# Patient Record
Sex: FEMALE | Race: OTHER | HISPANIC OR LATINO
[De-identification: names, ages, dates, MRNs, and addresses within clinical notes are randomized per-mention and may not be internally consistent; named-entity substitution may affect disease eponyms.]

---

## 2022-05-20 PROBLEM — Z00.00 ENCOUNTER FOR PREVENTIVE HEALTH EXAMINATION: Status: ACTIVE | Noted: 2022-05-20

## 2022-05-27 ENCOUNTER — APPOINTMENT (OUTPATIENT)
Dept: OBGYN | Facility: CLINIC | Age: 42
End: 2022-05-27

## 2022-06-08 ENCOUNTER — APPOINTMENT (OUTPATIENT)
Dept: OBGYN | Facility: CLINIC | Age: 42
End: 2022-06-08
Payer: MEDICAID

## 2022-06-08 ENCOUNTER — LABORATORY RESULT (OUTPATIENT)
Age: 42
End: 2022-06-08

## 2022-06-08 VITALS
SYSTOLIC BLOOD PRESSURE: 110 MMHG | BODY MASS INDEX: 38.14 KG/M2 | DIASTOLIC BLOOD PRESSURE: 70 MMHG | WEIGHT: 202 LBS | HEIGHT: 61 IN

## 2022-06-08 DIAGNOSIS — Z82.49 FAMILY HISTORY OF ISCHEMIC HEART DISEASE AND OTHER DISEASES OF THE CIRCULATORY SYSTEM: ICD-10-CM

## 2022-06-08 DIAGNOSIS — Z78.9 OTHER SPECIFIED HEALTH STATUS: ICD-10-CM

## 2022-06-08 DIAGNOSIS — Z83.438 FAMILY HISTORY OF OTHER DISORDER OF LIPOPROTEIN METABOLISM AND OTHER LIPIDEMIA: ICD-10-CM

## 2022-06-08 DIAGNOSIS — Z01.419 ENCOUNTER FOR GYNECOLOGICAL EXAMINATION (GENERAL) (ROUTINE) W/OUT ABNORMAL FINDINGS: ICD-10-CM

## 2022-06-08 DIAGNOSIS — Z98.890 OTHER SPECIFIED POSTPROCEDURAL STATES: ICD-10-CM

## 2022-06-08 DIAGNOSIS — Z12.39 ENCOUNTER FOR OTHER SCREENING FOR MALIGNANT NEOPLASM OF BREAST: ICD-10-CM

## 2022-06-08 PROCEDURE — 99395 PREV VISIT EST AGE 18-39: CPT

## 2022-06-08 NOTE — PHYSICAL EXAM
[Chaperone Present] : A chaperone was present in the examining room during all aspects of the physical examination [Appropriately responsive] : appropriately responsive [Alert] : alert [No Acute Distress] : no acute distress [No Lymphadenopathy] : no lymphadenopathy [Regular Rate Rhythm] : regular rate rhythm [No Murmurs] : no murmurs [Clear to Auscultation B/L] : clear to auscultation bilaterally [Soft] : soft [Non-tender] : non-tender [Non-distended] : non-distended [No HSM] : No HSM [No Lesions] : no lesions [No Mass] : no mass [Oriented x3] : oriented x3 [Examination Of The Breasts] : a normal appearance [No Masses] : no breast masses were palpable [Labia Majora] : normal [Labia Minora] : normal [Normal] : normal [Tenderness] : tender [Enlarged ___ wks] : enlarged [unfilled] ~Uweeks [Uterine Adnexae] : normal [Declined] : Patient declined rectal exam [FreeTextEntry1] : PH [FreeTextEntry6] : Fundus of uterus [FreeTextEntry8] : Tender fundus of uterus enlarged approximately 6 weeks no adnexal masses

## 2022-06-08 NOTE — HISTORY OF PRESENT ILLNESS
[HIV test declined] : HIV test declined [Syphilis test declined] : Syphilis test declined [Gonorrhea test declined] : Gonorrhea test declined [Chlamydia test declined] : Chlamydia test declined [Trichomonas test declined] : Trichomonas test declined [Hepatitis B test declined] : Hepatitis B test declined [Hepatitis C test declined] : Hepatitis C test declined [N] : Patient does not use contraception [Y] : Positive pregnancy history [Normal Amount/Duration] :  normal amount and duration [Regular Cycle Intervals] : periods have been regular [Currently Active] : currently active [Men] : men [Vaginal] : vaginal [No] : No [HPV test declined] : HPV test declined [TextBox_4] : NEW PT HERE FOR WELL WOMEN'S EXAM C/O ABDOMINAL PAIN [PapSmeardate] : 2014 [LMPDate] : 6/1/22 [MensesFreq] : 28 [MensesLength] : 4-5 [PGHxTotal] : 3 [[facility]Term] : 3 [[facility] Corporation[facility]] : 3 [FreeTextEntry1] : 6/1/22

## 2022-06-08 NOTE — PLAN
[FreeTextEntry1] : Patient is here for annual examination she is status post NovaSure ablation and bilateral tubal ligation approximately 7 years ago now complains of midline lower pelvic abdominal pain with tenderness to palpation over fundus of uterus.  Patient will be sent for transvaginal sonogram, Pap smear has been completed she will continue vitamin D3 multivitamin and exercise we discussed weight loss and all questions answered in detail.  Patient will return in 12 months

## 2022-06-10 LAB
C TRACH RRNA SPEC QL NAA+PROBE: NOT DETECTED
HPV HIGH+LOW RISK DNA PNL CVX: DETECTED
N GONORRHOEA RRNA SPEC QL NAA+PROBE: NOT DETECTED
SOURCE TP AMPLIFICATION: NORMAL

## 2022-06-17 ENCOUNTER — APPOINTMENT (OUTPATIENT)
Dept: OBGYN | Facility: CLINIC | Age: 42
End: 2022-06-17

## 2022-06-22 ENCOUNTER — APPOINTMENT (OUTPATIENT)
Dept: OBGYN | Facility: CLINIC | Age: 42
End: 2022-06-22
Payer: MEDICAID

## 2022-06-22 ENCOUNTER — ASOB RESULT (OUTPATIENT)
Age: 42
End: 2022-06-22

## 2022-06-22 ENCOUNTER — NON-APPOINTMENT (OUTPATIENT)
Age: 42
End: 2022-06-22

## 2022-06-22 PROCEDURE — 76830 TRANSVAGINAL US NON-OB: CPT

## 2022-06-22 PROCEDURE — 76857 US EXAM PELVIC LIMITED: CPT

## 2022-11-22 ENCOUNTER — OFFICE (OUTPATIENT)
Dept: URBAN - METROPOLITAN AREA CLINIC 63 | Facility: CLINIC | Age: 42
Setting detail: OPHTHALMOLOGY
End: 2022-11-22
Payer: COMMERCIAL

## 2022-11-22 DIAGNOSIS — H40.013: ICD-10-CM

## 2022-11-22 DIAGNOSIS — G43.119: ICD-10-CM

## 2022-11-22 DIAGNOSIS — D31.40: ICD-10-CM

## 2022-11-22 DIAGNOSIS — H11.153: ICD-10-CM

## 2022-11-22 DIAGNOSIS — H16.422: ICD-10-CM

## 2022-11-22 PROCEDURE — 92014 COMPRE OPH EXAM EST PT 1/>: CPT | Performed by: STUDENT IN AN ORGANIZED HEALTH CARE EDUCATION/TRAINING PROGRAM

## 2022-11-22 PROCEDURE — 92285 EXTERNAL OCULAR PHOTOGRAPHY: CPT | Performed by: STUDENT IN AN ORGANIZED HEALTH CARE EDUCATION/TRAINING PROGRAM

## 2022-11-22 ASSESSMENT — PACHYMETRY
OS_CT_UM: 539
OS_CT_CORRECTION: 1
OD_CT_UM: 541
OD_CT_CORRECTION: 0

## 2022-11-22 ASSESSMENT — KERATOMETRY
OS_AXISANGLE_DEGREES: 097
OS_K1POWER_DIOPTERS: 43.00
OD_AXISANGLE_DEGREES: 090
OD_K2POWER_DIOPTERS: 43.25
OS_K2POWER_DIOPTERS: 43.75
OD_K1POWER_DIOPTERS: 42.75

## 2022-11-22 ASSESSMENT — TONOMETRY
OS_IOP_MMHG: 21
OD_IOP_MMHG: 18
OD_IOP_MMHG: 17
OS_IOP_MMHG: 20

## 2022-11-22 ASSESSMENT — VISUAL ACUITY
OD_BCVA: 20/20
OS_BCVA: 20/20

## 2022-11-22 ASSESSMENT — REFRACTION_AUTOREFRACTION
OS_SPHERE: +0.75
OD_SPHERE: +0.50
OS_CYLINDER: -0.25
OD_AXIS: 000
OD_CYLINDER: 0.00
OS_AXIS: 030

## 2022-11-22 ASSESSMENT — CONFRONTATIONAL VISUAL FIELD TEST (CVF)
OS_FINDINGS: FULL
OD_FINDINGS: FULL

## 2022-11-22 ASSESSMENT — AXIALLENGTH_DERIVED
OD_AL: 23.5808
OS_AL: 23.396

## 2022-11-22 ASSESSMENT — VASCULARIZATION: OS_VASCULARIZATION: INFERIOR PANNUS

## 2022-11-22 ASSESSMENT — SPHEQUIV_DERIVED
OD_SPHEQUIV: 0.5
OS_SPHEQUIV: 0.625

## 2023-05-10 ENCOUNTER — APPOINTMENT (OUTPATIENT)
Dept: OBGYN | Facility: CLINIC | Age: 43
End: 2023-05-10
Payer: MEDICAID

## 2023-05-10 DIAGNOSIS — B37.31 ACUTE CANDIDIASIS OF VULVA AND VAGINA: ICD-10-CM

## 2023-05-10 LAB
BILIRUB UR QL STRIP: NEGATIVE
CLARITY UR: CLEAR
COLLECTION METHOD: NORMAL
GLUCOSE UR-MCNC: NEGATIVE
HCG UR QL: 0.2 EU/DL
HGB UR QL STRIP.AUTO: NORMAL
KETONES UR-MCNC: NEGATIVE
LEUKOCYTE ESTERASE UR QL STRIP: NEGATIVE
NITRITE UR QL STRIP: NEGATIVE
PH UR STRIP: 5.5
PROT UR STRIP-MCNC: NEGATIVE
SP GR UR STRIP: 1.02

## 2023-05-10 PROCEDURE — 81002 URINALYSIS NONAUTO W/O SCOPE: CPT

## 2023-05-10 PROCEDURE — 99213 OFFICE O/P EST LOW 20 MIN: CPT

## 2023-05-10 RX ORDER — TERCONAZOLE 8 MG/G
0.8 CREAM VAGINAL
Qty: 1 | Refills: 0 | Status: ACTIVE | COMMUNITY
Start: 2023-05-10 | End: 1900-01-01

## 2023-05-10 RX ORDER — CLOTRIMAZOLE AND BETAMETHASONE DIPROPIONATE 10; .5 MG/G; MG/G
1-0.05 CREAM TOPICAL
Qty: 1 | Refills: 0 | Status: ACTIVE | COMMUNITY
Start: 2023-05-10 | End: 1900-01-01

## 2023-05-10 NOTE — HISTORY OF PRESENT ILLNESS
[Normal Amount/Duration] :  normal amount and duration [Regular Cycle Intervals] : periods have been regular [FreeTextEntry1] : 43 y/o female here for UTI symptoms. Pt went to [facility] two weeks ago and was told she has a UTI. Pt was given medication and 4 days later she broke out in hives and it did not agree with her stomach. Since then she has vaginal irritation and a heavier bleeding with her period.  Patient states the majority of irritation is on the external genitalia vulva majora with severe pruritus.  She does admit to vaginal discharge as well. [TextBox_4] : UTI SYMPTOMS\par LMP:5/1/2023 [MensesFreq] : 28 [MensesLength] : 5 [TextBox_13] : 28 [TextBox_15] : 5

## 2023-05-10 NOTE — PLAN
[FreeTextEntry1] : Patient will be treated with terconazole No. 3 and clotrimazole betamethasone cream twice daily for 5 days urine analysis is negative culture will be resent return for yearly checkup

## 2023-05-10 NOTE — PHYSICAL EXAM
[Chaperone Present] : A chaperone was present in the examining room during all aspects of the physical examination [Vulvitis] : vulvitis [Labia Majora] : normal [Labia Minora] : normal [Discharge] : a  ~M vaginal discharge was present [Moderate] : moderate [White] : white [Cheesy] : cheesy [Mucoid] : mucoid [Normal] : normal [Uterine Adnexae] : normal [Declined] : Patient declined rectal exam [FreeTextEntry1] : Asked tensive

## 2023-05-10 NOTE — REVIEW OF SYSTEMS
[Patient Intake Form Reviewed] : Patient intake form was reviewed [Negative] : Heme/Lymph [Genital Rash/Irritation] : genital rash/irritation

## 2023-06-02 ENCOUNTER — APPOINTMENT (OUTPATIENT)
Dept: OBGYN | Facility: CLINIC | Age: 43
End: 2023-06-02
Payer: MEDICAID

## 2023-06-02 ENCOUNTER — ASOB RESULT (OUTPATIENT)
Age: 43
End: 2023-06-02

## 2023-06-02 PROCEDURE — 76830 TRANSVAGINAL US NON-OB: CPT

## 2023-07-31 ENCOUNTER — LABORATORY RESULT (OUTPATIENT)
Age: 43
End: 2023-07-31

## 2023-07-31 ENCOUNTER — APPOINTMENT (OUTPATIENT)
Dept: OBGYN | Facility: CLINIC | Age: 43
End: 2023-07-31
Payer: MEDICAID

## 2023-07-31 DIAGNOSIS — R39.9 UNSPECIFIED SYMPTOMS AND SIGNS INVOLVING THE GENITOURINARY SYSTEM: ICD-10-CM

## 2023-07-31 PROCEDURE — 81002 URINALYSIS NONAUTO W/O SCOPE: CPT

## 2023-07-31 PROCEDURE — 99213 OFFICE O/P EST LOW 20 MIN: CPT

## 2023-07-31 RX ORDER — NITROFURANTOIN (MONOHYDRATE/MACROCRYSTALS) 25; 75 MG/1; MG/1
100 CAPSULE ORAL AS DIRECTED
Qty: 20 | Refills: 0 | Status: ACTIVE | COMMUNITY
Start: 2023-07-31 | End: 1900-01-01

## 2023-07-31 RX ORDER — CEPHALEXIN 500 MG/1
500 TABLET ORAL
Qty: 14 | Refills: 0 | Status: ACTIVE | COMMUNITY
Start: 2023-07-31 | End: 1900-01-01

## 2023-07-31 NOTE — HISTORY OF PRESENT ILLNESS
[Normal Amount/Duration] :  normal amount and duration [Regular Cycle Intervals] : periods have been regular [Currently Active] : currently active [Men] : men [Vaginal] : vaginal [No] : No [FreeTextEntry1] : 43 y/o female here with UTI symptoms.  Patient now has dysuria and frequency urine analysis completed shows trace leukocytes nitrazine negative urine culture sent in light of her reported current symptoms she will start cephalexin 500 mg p.o. twice daily we also discussed possible recurrent UTI 3 times over the last 2 months secondary to complication after intercourse patient will take 1 Macrobid 130 minutes postcoital as a prophylactic measure if no improvement patient will see your gynecologist she has been given a referral she will return for annual exam [TextBox_4] : UTI SYMPTOMS  LMP: [MensesFreq] : 28 [MensesLength] : 5 [TextBox_13] : 28 [TextBox_15] : 5

## 2023-08-01 LAB
APPEARANCE: CLEAR
BILIRUB UR QL STRIP: NEGATIVE
BILIRUBIN URINE: NEGATIVE
BLOOD URINE: NEGATIVE
CLARITY UR: CLEAR
COLLECTION METHOD: NORMAL
COLOR: YELLOW
GLUCOSE QUALITATIVE U: NEGATIVE MG/DL
GLUCOSE UR-MCNC: NEGATIVE
HCG UR QL: 0.2 EU/DL
HGB UR QL STRIP.AUTO: NEGATIVE
KETONES UR-MCNC: NEGATIVE
KETONES URINE: NEGATIVE MG/DL
LEUKOCYTE ESTERASE UR QL STRIP: NORMAL
LEUKOCYTE ESTERASE URINE: ABNORMAL
NITRITE UR QL STRIP: NEGATIVE
NITRITE URINE: NEGATIVE
PH UR STRIP: 7.5
PH URINE: 7.5
PROT UR STRIP-MCNC: NEGATIVE
PROTEIN URINE: NEGATIVE MG/DL
SP GR UR STRIP: 1.02
SPECIFIC GRAVITY URINE: 1.02
UROBILINOGEN URINE: 0.2 MG/DL

## 2023-08-02 LAB — BACTERIA UR CULT: NORMAL

## 2023-08-04 ENCOUNTER — APPOINTMENT (OUTPATIENT)
Dept: OBGYN | Facility: CLINIC | Age: 43
End: 2023-08-04
Payer: MEDICAID

## 2023-08-04 ENCOUNTER — ASOB RESULT (OUTPATIENT)
Age: 43
End: 2023-08-04

## 2023-08-04 PROCEDURE — 76830 TRANSVAGINAL US NON-OB: CPT

## 2024-08-05 ENCOUNTER — APPOINTMENT (OUTPATIENT)
Dept: OBGYN | Facility: CLINIC | Age: 44
End: 2024-08-05

## 2024-08-05 PROBLEM — N95.1 VASOMOTOR SYMPTOMS DUE TO MENOPAUSE: Status: ACTIVE | Noted: 2024-08-05

## 2024-08-05 PROBLEM — R39.9 UTI SYMPTOMS: Status: RESOLVED | Noted: 2023-05-10 | Resolved: 2024-08-05

## 2024-08-05 PROBLEM — N92.6 IRREGULAR MENSES: Status: ACTIVE | Noted: 2024-08-05

## 2024-08-05 PROCEDURE — 99213 OFFICE O/P EST LOW 20 MIN: CPT

## 2024-08-05 NOTE — HISTORY OF PRESENT ILLNESS
[FreeTextEntry1] : 43-year-old with history of anxiety was treated with benzodiazepine for several days however stopped on her own accord still with severe anxiety which causes skin rashes periodically.  Patient had LMP 7/3/2024 consisted of vaginal spotting prior menstrual period was 4/20/2024.  Patient missed her menses in May 2024 in June 2024.  Patient complains of mild hot flashes and sweats has a history of irregular menses have been treated with hydroxyzine by PMD which she still takes.  Patient denies vaginal dryness mood changes etc.  Plan is for patient to have menopausal panel, transvaginal sonogram and consult she will follow-up with PMD regarding treatment of her anxiety she will return here after having screening completed and will have consultation at that time.  Return here in 2 weeks [TextBox_4] : PT HERE FOR CONSULTATION FOR MENOPAUSE LMP: 7/3/24, : 4/2024 Negative

## 2024-08-05 NOTE — PLAN
[FreeTextEntry1] : Patient will have menopausal panel transvaginal sonogram return in 2 weeks for consultation

## 2024-08-14 ENCOUNTER — APPOINTMENT (OUTPATIENT)
Dept: OBGYN | Facility: CLINIC | Age: 44
End: 2024-08-14
Payer: MEDICAID

## 2024-08-14 PROCEDURE — 76857 US EXAM PELVIC LIMITED: CPT | Mod: 59

## 2024-08-14 PROCEDURE — 76830 TRANSVAGINAL US NON-OB: CPT

## 2024-08-14 PROCEDURE — 99213 OFFICE O/P EST LOW 20 MIN: CPT

## 2024-08-14 NOTE — HISTORY OF PRESENT ILLNESS
[FreeTextEntry1] : Patient here to review sonogram results status post transvaginal sono today secondary to deep pelvic pain mostly when coughing and straining.  Patient denies changes in bowel movements she denies urinary or rectal incontinence.  There is no bloating nausea or other GI symptoms.  Patient denies dyspareunia she is currently sexually active and her menses have been irregular however very light apparently she had an NovaSure ablation and the sonogram today showed some calcifications with intrauterine scarring however no masses polyps or adnexal masses are noted on today's sonogram .  My impression is chronic pelvic pain pelvic laxity and muscle incoordination I have set patient up for physical therapy and she will be given a referral to [facility] physical therapy for pelvic strengthening exercises she will return for yearly checkup [TextBox_4] : PT HERE FOR TRANSVAGINAL SONOGRAM / CONSULTATION

## 2024-08-21 ENCOUNTER — APPOINTMENT (OUTPATIENT)
Dept: OBGYN | Facility: CLINIC | Age: 44
End: 2024-08-21
Payer: MEDICAID

## 2024-08-21 VITALS
HEIGHT: 61 IN | BODY MASS INDEX: 37.19 KG/M2 | HEART RATE: 83 BPM | WEIGHT: 197 LBS | SYSTOLIC BLOOD PRESSURE: 132 MMHG | DIASTOLIC BLOOD PRESSURE: 84 MMHG

## 2024-08-21 DIAGNOSIS — Z11.3 ENCOUNTER FOR SCREENING FOR INFECTIONS WITH A PREDOMINANTLY SEXUAL MODE OF TRANSMISSION: ICD-10-CM

## 2024-08-21 DIAGNOSIS — Z11.51 ENCOUNTER FOR SCREENING FOR HUMAN PAPILLOMAVIRUS (HPV): ICD-10-CM

## 2024-08-21 DIAGNOSIS — Z01.419 ENCOUNTER FOR GYNECOLOGICAL EXAMINATION (GENERAL) (ROUTINE) W/OUT ABNORMAL FINDINGS: ICD-10-CM

## 2024-08-21 PROCEDURE — 99396 PREV VISIT EST AGE 40-64: CPT

## 2024-08-21 PROCEDURE — 99459 PELVIC EXAMINATION: CPT

## 2024-08-21 NOTE — HISTORY OF PRESENT ILLNESS
[HIV test declined] : HIV test declined [Syphilis test declined] : Syphilis test declined [Gonorrhea test declined] : Gonorrhea test declined [Chlamydia test declined] : Chlamydia test declined [Trichomonas test declined] : Trichomonas test declined [HPV test declined] : HPV test declined [Hepatitis B test declined] : Hepatitis B test declined [Hepatitis C test declined] : Hepatitis C test declined [N] : Patient does not use contraception [Y] : Positive pregnancy history [Currently Active] : currently active [Men] : men [Vaginal] : vaginal [No] : No [Patient reported colonoscopy was normal] : Patient reported colonoscopy was normal [TextBox_4] : PT HERE FOR ANNUAL EXAM LMP: 8/14/24 [PapSmeardate] : 6/8/22 [TextBox_31] : WNL [ColonoscopyDate] : 7 YRS AGO [LMPDate] : 8/14/24 [PGHxTotal] : 3 [[facility]] : 3 [[facility]] : 3 [TextBox_6] : 8/14/24 [FreeTextEntry1] : 8/14/24

## 2024-08-21 NOTE — PLAN
[FreeTextEntry1] : Pap smear completed patient will have mammogram bilateral breast sonogram she will have repeat colonoscopy at age 47 continue multivitamins vitamin D3 return in 12 months.

## 2024-08-21 NOTE — PHYSICAL EXAM
[Chaperone Present] : A chaperone was present in the examining room during all aspects of the physical examination [[id number]] : A chaperone was present during the pelvic exam. [Appropriately responsive] : appropriately responsive [Alert] : alert [No Acute Distress] : no acute distress [No Lymphadenopathy] : no lymphadenopathy [Regular Rate Rhythm] : regular rate rhythm [No Murmurs] : no murmurs [Clear to Auscultation B/L] : clear to auscultation bilaterally [Soft] : soft [Non-tender] : non-tender [Non-distended] : non-distended [No HSM] : No HSM [No Lesions] : no lesions [No Mass] : no mass [Oriented x3] : oriented x3 [Examination Of The Breasts] : a normal appearance [No Masses] : no breast masses were palpable [Labia Majora] : normal [Labia Minora] : normal [Normal] : normal [Uterine Adnexae] : normal [FreeTextEntry2] : KF

## 2024-08-26 LAB
C TRACH RRNA SPEC QL NAA+PROBE: NOT DETECTED
CYTOLOGY CVX/VAG DOC THIN PREP: NORMAL
HPV HIGH+LOW RISK DNA PNL CVX: NOT DETECTED
N GONORRHOEA RRNA SPEC QL NAA+PROBE: NOT DETECTED
SOURCE TP AMPLIFICATION: NORMAL

## 2025-01-15 ENCOUNTER — APPOINTMENT (OUTPATIENT)
Dept: OBGYN | Facility: CLINIC | Age: 45
End: 2025-01-15
Payer: MEDICAID

## 2025-01-15 VITALS
HEART RATE: 96 BPM | SYSTOLIC BLOOD PRESSURE: 129 MMHG | BODY MASS INDEX: 37.19 KG/M2 | WEIGHT: 197 LBS | DIASTOLIC BLOOD PRESSURE: 88 MMHG | HEIGHT: 61 IN

## 2025-01-15 DIAGNOSIS — Z98.890 OTHER SPECIFIED POSTPROCEDURAL STATES: ICD-10-CM

## 2025-01-15 DIAGNOSIS — N89.8 OTHER SPECIFIED NONINFLAMMATORY DISORDERS OF VAGINA: ICD-10-CM

## 2025-01-15 LAB
BILIRUB UR QL STRIP: NORMAL
GLUCOSE UR-MCNC: NORMAL
HCG UR QL: 0.2 EU/DL
HGB UR QL STRIP.AUTO: NORMAL
KETONES UR-MCNC: NORMAL
LEUKOCYTE ESTERASE UR QL STRIP: NORMAL
NITRITE UR QL STRIP: NORMAL
PH UR STRIP: 5.5
PROT UR STRIP-MCNC: NORMAL
SP GR UR STRIP: 1.03

## 2025-01-15 PROCEDURE — 99213 OFFICE O/P EST LOW 20 MIN: CPT | Mod: 25

## 2025-01-15 PROCEDURE — 81003 URINALYSIS AUTO W/O SCOPE: CPT | Mod: QW

## 2025-01-19 ENCOUNTER — NON-APPOINTMENT (OUTPATIENT)
Age: 45
End: 2025-01-19
